# Patient Record
Sex: FEMALE | Race: WHITE | NOT HISPANIC OR LATINO | URBAN - METROPOLITAN AREA
[De-identification: names, ages, dates, MRNs, and addresses within clinical notes are randomized per-mention and may not be internally consistent; named-entity substitution may affect disease eponyms.]

---

## 2019-09-25 NOTE — PATIENT DISCUSSION
New Prescription: Lotemax SM (loteprednol etabonate): drops,gel: 0.38% 1 drop twice a day into right eye 09-

## 2019-09-25 NOTE — PATIENT DISCUSSION
Pinguecula Counseling:  I have explained to the patient at length the diagnosis of pinguecula and its pathophysiology. I recommended the patient adequately protect their eyes from excessive UV light and dry, aubrie conditions. The use of artificial tears in dry conditions was encouraged. Return for follow-up as scheduled.

## 2019-09-25 NOTE — PATIENT DISCUSSION
EUFEMIA WITH OCULAR ROSACEA, OU:  PRESCRIBE WARM COMPRESSES AND EYELID SCRUBS QD-BID, ARTIFICIAL TEARS BID-QID AND THE DAILY INTAKE OF OMEGA-3 FATTY ACIDS. CONSIDER TOPICAL STEROID, ORAL TETRACYCLINE AND/OR LIPIFLOW FOR EXACERBATIONS. RETURN FOR FOLLOW-UP AS SCHEDULED.

## 2019-10-07 NOTE — PATIENT DISCUSSION
CATARACTS, OU- NOT VISUALLY SIGNIFICANT. DISC OPT OF GLS/BMUA-XT-VYEPJT-VS-REFRACTIVE SX TO REDUCE DEPENDENCY ON GLS.

## 2019-10-07 NOTE — PATIENT DISCUSSION
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 DISCUSSED OPTIONS OF LASIK-VS- RLE WITH THE OPT OF MONOVISION-VS- MULTI-FOCAL LENSES.

## 2019-10-07 NOTE — PATIENT DISCUSSION
DISC ALL SACRIFICES WITH MONOVA, MULTIFOCAL, AND EDOF LENSES IF PT DESIRES RLE SX. IT WAS EXPLAINED TO THE PT THAT CORRECTING FOR DVA OU WOULD REQUIRE PT TO WEAR GLASSES FOR ALL NVA/INT ACTIVITIES AND CORRECTING PT FOR NVA OU WOULD REQUIRE WEARING GLASSES FOR ALL DISTANCE/INT ACTIVITIES. PT DISLIKES THE IDEA OF MONOVISION.

## 2019-10-07 NOTE — PATIENT DISCUSSION
Continue: Lotemax SM (loteprednol etabonate): drops,gel: 0.38% 1 drop twice a day into right eye 09-

## 2019-10-18 NOTE — PATIENT DISCUSSION
DISCUSSED PANOPTIX IOL TO REDUCE DEPENDENCE ON READING GLASSES VS THE 50/50 CHANCE OF A DISTANCE AND NEAR IOL.

## 2019-10-18 NOTE — PATIENT DISCUSSION
PATIENT MAY QUALIFY FOR BRAVO RESEARCH STUDY AND AFTER DISCUSSION WOULD LIKE TO RECEIVE MORE INFORMATION BEFORE DECIDING.

## 2019-10-18 NOTE — PATIENT DISCUSSION
CATARACT, OU - VISUALLY SIGNIFICANT OU. SCHEDULE SX OS (DOMIATE EYE) THEN LATER IN OD IF VISUAL SYMPTOMS PERSIST.  GLS RX GIVEN TO FILL IF DESIRES IN THE EVENT PT DOES NOT PROCEED W/ SX.

## 2019-10-18 NOTE — PATIENT DISCUSSION
New Prescription: prednisol ace-gatiflox-bromfen (prednisol ace-gatiflox-bromfen): drops,suspension: 1-0.5-0.075% 1 drop three times a day as directed into affected eye 10-

## 2019-10-31 NOTE — PATIENT DISCUSSION
Continue: prednisol ace-gatiflox-bromfen (prednisol ace-gatiflox-bromfen): drops,suspension: 1-0.5-0.075% 1 drop three times a day as directed into affected eye 10-

## 2019-10-31 NOTE — PATIENT DISCUSSION
S/P RLE, OS- STABLE, DOING WELL. OK TO PROCEED WITH FELLOW EYE SURGERY. PT REPORTS THAT THEY ARE HAPPY WITH THE OUTCOME OF THE OPERATIVE EYE AND READY TO PURSUE SX IN THE FELLOW EYE.

## 2019-11-07 NOTE — PATIENT DISCUSSION
***This patient had refractive cataract surgery performed. A multifocal IOL was placed to achieve a target refraction of plano (which should provide them with satisfactory distance and near). ***

## 2022-06-08 ENCOUNTER — NEW PATIENT (OUTPATIENT)
Dept: URBAN - METROPOLITAN AREA CLINIC 52 | Facility: CLINIC | Age: 37
End: 2022-06-08

## 2022-06-08 DIAGNOSIS — Z01.01: ICD-10-CM

## 2022-06-08 DIAGNOSIS — H52.4: ICD-10-CM

## 2022-06-08 PROCEDURE — 92004 COMPRE OPH EXAM NEW PT 1/>: CPT

## 2022-06-08 PROCEDURE — 92015 DETERMINE REFRACTIVE STATE: CPT

## 2022-06-08 ASSESSMENT — KERATOMETRY
OD_AXISANGLE_DEGREES: 177
OD_K2POWER_DIOPTERS: 44.25
OS_AXISANGLE_DEGREES: 177
OD_K1POWER_DIOPTERS: 43.75
OD_AXISANGLE2_DEGREES: 87
OS_K1POWER_DIOPTERS: 44.00
OS_K2POWER_DIOPTERS: 44.50
OS_AXISANGLE2_DEGREES: 87

## 2022-06-08 ASSESSMENT — TONOMETRY
OS_IOP_MMHG: 14
OD_IOP_MMHG: 14

## 2022-06-08 ASSESSMENT — VISUAL ACUITY
OD_SC: 20/20
OS_SC: 20/20
OU_SC: 20/20+2

## 2023-07-14 ENCOUNTER — COMPREHENSIVE EXAM (OUTPATIENT)
Dept: URBAN - METROPOLITAN AREA CLINIC 50 | Facility: CLINIC | Age: 38
End: 2023-07-14

## 2023-07-14 DIAGNOSIS — Z01.01: ICD-10-CM

## 2023-07-14 DIAGNOSIS — H53.2: ICD-10-CM

## 2023-07-14 DIAGNOSIS — H04.123: ICD-10-CM

## 2023-07-14 DIAGNOSIS — H52.4: ICD-10-CM

## 2023-07-14 PROCEDURE — 92015 DETERMINE REFRACTIVE STATE: CPT

## 2023-07-14 PROCEDURE — 92014 COMPRE OPH EXAM EST PT 1/>: CPT

## 2023-07-14 ASSESSMENT — VISUAL ACUITY
OD_SC: 20/20
OU_SC: 20/20-1
OU_SC: J1+@16"
OS_SC: 20/20

## 2023-07-14 ASSESSMENT — KERATOMETRY
OD_AXISANGLE2_DEGREES: 76
OD_AXISANGLE_DEGREES: 166
OS_AXISANGLE2_DEGREES: 82
OS_K2POWER_DIOPTERS: 44.75
OS_K1POWER_DIOPTERS: 44.25
OD_K2POWER_DIOPTERS: 44.25
OD_K1POWER_DIOPTERS: 44.00
OS_AXISANGLE_DEGREES: 172

## 2023-07-14 ASSESSMENT — TONOMETRY
OS_IOP_MMHG: 18
OD_IOP_MMHG: 17

## 2024-04-14 NOTE — PATIENT DISCUSSION
1 MO S/P PCIOL OU (PAN OPTIX) - DOING WELL, VERY HAPPY W/ VISION. RX REFRESH OR SYSTANE FOR PRN USE. MONITOR X 1 YEAR OR PRN. Continue CellCept.  The patient is followed by nephrology